# Patient Record
Sex: FEMALE | URBAN - METROPOLITAN AREA
[De-identification: names, ages, dates, MRNs, and addresses within clinical notes are randomized per-mention and may not be internally consistent; named-entity substitution may affect disease eponyms.]

---

## 2023-09-09 ENCOUNTER — INPATIENT (INPATIENT)
Facility: HOSPITAL | Age: 58
LOS: 1 days | Discharge: HOME HEALTH | DRG: 45 | End: 2023-09-11
Attending: HOSPITALIST | Admitting: STUDENT IN AN ORGANIZED HEALTH CARE EDUCATION/TRAINING PROGRAM
Payer: MEDICAID

## 2023-09-09 VITALS
SYSTOLIC BLOOD PRESSURE: 192 MMHG | DIASTOLIC BLOOD PRESSURE: 122 MMHG | HEIGHT: 65 IN | RESPIRATION RATE: 19 BRPM | HEART RATE: 120 BPM | OXYGEN SATURATION: 99 %

## 2023-09-09 DIAGNOSIS — G45.9 TRANSIENT CEREBRAL ISCHEMIC ATTACK, UNSPECIFIED: ICD-10-CM

## 2023-09-09 DIAGNOSIS — I16.1 HYPERTENSIVE EMERGENCY: ICD-10-CM

## 2023-09-09 LAB
ALBUMIN SERPL ELPH-MCNC: 4 G/DL — SIGNIFICANT CHANGE UP (ref 3.3–5)
ALP SERPL-CCNC: 67 U/L — SIGNIFICANT CHANGE UP (ref 40–120)
ALT FLD-CCNC: 24 U/L — SIGNIFICANT CHANGE UP (ref 12–78)
ANION GAP SERPL CALC-SCNC: 8 MMOL/L — SIGNIFICANT CHANGE UP (ref 5–17)
APTT BLD: 30.9 SEC — SIGNIFICANT CHANGE UP (ref 24.5–35.6)
AST SERPL-CCNC: 15 U/L — SIGNIFICANT CHANGE UP (ref 15–37)
BASOPHILS # BLD AUTO: 0.04 K/UL — SIGNIFICANT CHANGE UP (ref 0–0.2)
BASOPHILS NFR BLD AUTO: 0.4 % — SIGNIFICANT CHANGE UP (ref 0–2)
BILIRUB SERPL-MCNC: 0.2 MG/DL — SIGNIFICANT CHANGE UP (ref 0.2–1.2)
BLD GP AB SCN SERPL QL: SIGNIFICANT CHANGE UP
BUN SERPL-MCNC: 19 MG/DL — SIGNIFICANT CHANGE UP (ref 7–23)
CALCIUM SERPL-MCNC: 9.3 MG/DL — SIGNIFICANT CHANGE UP (ref 8.5–10.1)
CHLORIDE SERPL-SCNC: 107 MMOL/L — SIGNIFICANT CHANGE UP (ref 96–108)
CO2 SERPL-SCNC: 23 MMOL/L — SIGNIFICANT CHANGE UP (ref 22–31)
CREAT SERPL-MCNC: 1.02 MG/DL — SIGNIFICANT CHANGE UP (ref 0.5–1.3)
EGFR: 65 ML/MIN/1.73M2 — SIGNIFICANT CHANGE UP
EOSINOPHIL # BLD AUTO: 0.33 K/UL — SIGNIFICANT CHANGE UP (ref 0–0.5)
EOSINOPHIL NFR BLD AUTO: 3.3 % — SIGNIFICANT CHANGE UP (ref 0–6)
GLUCOSE SERPL-MCNC: 122 MG/DL — HIGH (ref 70–99)
HCT VFR BLD CALC: 37.7 % — SIGNIFICANT CHANGE UP (ref 34.5–45)
HGB BLD-MCNC: 12.5 G/DL — SIGNIFICANT CHANGE UP (ref 11.5–15.5)
IMM GRANULOCYTES NFR BLD AUTO: 0.3 % — SIGNIFICANT CHANGE UP (ref 0–0.9)
INR BLD: 0.91 RATIO — SIGNIFICANT CHANGE UP (ref 0.85–1.18)
LYMPHOCYTES # BLD AUTO: 4.29 K/UL — HIGH (ref 1–3.3)
LYMPHOCYTES # BLD AUTO: 43 % — SIGNIFICANT CHANGE UP (ref 13–44)
MCHC RBC-ENTMCNC: 28 PG — SIGNIFICANT CHANGE UP (ref 27–34)
MCHC RBC-ENTMCNC: 33.2 GM/DL — SIGNIFICANT CHANGE UP (ref 32–36)
MCV RBC AUTO: 84.5 FL — SIGNIFICANT CHANGE UP (ref 80–100)
MONOCYTES # BLD AUTO: 0.62 K/UL — SIGNIFICANT CHANGE UP (ref 0–0.9)
MONOCYTES NFR BLD AUTO: 6.2 % — SIGNIFICANT CHANGE UP (ref 2–14)
NEUTROPHILS # BLD AUTO: 4.66 K/UL — SIGNIFICANT CHANGE UP (ref 1.8–7.4)
NEUTROPHILS NFR BLD AUTO: 46.8 % — SIGNIFICANT CHANGE UP (ref 43–77)
PLATELET # BLD AUTO: 377 K/UL — SIGNIFICANT CHANGE UP (ref 150–400)
POTASSIUM SERPL-MCNC: 3.5 MMOL/L — SIGNIFICANT CHANGE UP (ref 3.5–5.3)
POTASSIUM SERPL-SCNC: 3.5 MMOL/L — SIGNIFICANT CHANGE UP (ref 3.5–5.3)
PROT SERPL-MCNC: 8.5 GM/DL — HIGH (ref 6–8.3)
PROTHROM AB SERPL-ACNC: 10.3 SEC — SIGNIFICANT CHANGE UP (ref 9.5–13)
RBC # BLD: 4.46 M/UL — SIGNIFICANT CHANGE UP (ref 3.8–5.2)
RBC # FLD: 13.9 % — SIGNIFICANT CHANGE UP (ref 10.3–14.5)
SODIUM SERPL-SCNC: 138 MMOL/L — SIGNIFICANT CHANGE UP (ref 135–145)
TROPONIN I, HIGH SENSITIVITY RESULT: 13.45 NG/L — SIGNIFICANT CHANGE UP
TROPONIN I, HIGH SENSITIVITY RESULT: 6.55 NG/L — SIGNIFICANT CHANGE UP
WBC # BLD: 9.97 K/UL — SIGNIFICANT CHANGE UP (ref 3.8–10.5)
WBC # FLD AUTO: 9.97 K/UL — SIGNIFICANT CHANGE UP (ref 3.8–10.5)

## 2023-09-09 PROCEDURE — 80061 LIPID PANEL: CPT

## 2023-09-09 PROCEDURE — 70544 MR ANGIOGRAPHY HEAD W/O DYE: CPT

## 2023-09-09 PROCEDURE — 93306 TTE W/DOPPLER COMPLETE: CPT

## 2023-09-09 PROCEDURE — 70450 CT HEAD/BRAIN W/O DYE: CPT | Mod: 26,MA,XU

## 2023-09-09 PROCEDURE — 71045 X-RAY EXAM CHEST 1 VIEW: CPT | Mod: 26

## 2023-09-09 PROCEDURE — 97116 GAIT TRAINING THERAPY: CPT | Mod: GP

## 2023-09-09 PROCEDURE — 80053 COMPREHEN METABOLIC PANEL: CPT

## 2023-09-09 PROCEDURE — 70496 CT ANGIOGRAPHY HEAD: CPT | Mod: 26,MA

## 2023-09-09 PROCEDURE — 85025 COMPLETE CBC W/AUTO DIFF WBC: CPT

## 2023-09-09 PROCEDURE — 84100 ASSAY OF PHOSPHORUS: CPT

## 2023-09-09 PROCEDURE — 97530 THERAPEUTIC ACTIVITIES: CPT | Mod: GO

## 2023-09-09 PROCEDURE — 83735 ASSAY OF MAGNESIUM: CPT

## 2023-09-09 PROCEDURE — 80048 BASIC METABOLIC PNL TOTAL CA: CPT

## 2023-09-09 PROCEDURE — 99223 1ST HOSP IP/OBS HIGH 75: CPT

## 2023-09-09 PROCEDURE — 93010 ELECTROCARDIOGRAM REPORT: CPT

## 2023-09-09 PROCEDURE — 36415 COLL VENOUS BLD VENIPUNCTURE: CPT

## 2023-09-09 PROCEDURE — 0042T: CPT | Mod: MA

## 2023-09-09 PROCEDURE — 92523 SPEECH SOUND LANG COMPREHEN: CPT | Mod: GN

## 2023-09-09 PROCEDURE — 97166 OT EVAL MOD COMPLEX 45 MIN: CPT | Mod: GO

## 2023-09-09 PROCEDURE — 97162 PT EVAL MOD COMPLEX 30 MIN: CPT | Mod: GP

## 2023-09-09 PROCEDURE — 70551 MRI BRAIN STEM W/O DYE: CPT

## 2023-09-09 PROCEDURE — 92610 EVALUATE SWALLOWING FUNCTION: CPT | Mod: GN

## 2023-09-09 PROCEDURE — 99285 EMERGENCY DEPT VISIT HI MDM: CPT | Mod: 25

## 2023-09-09 PROCEDURE — 84484 ASSAY OF TROPONIN QUANT: CPT

## 2023-09-09 PROCEDURE — 70498 CT ANGIOGRAPHY NECK: CPT | Mod: 26,MA

## 2023-09-09 RX ORDER — HEPARIN SODIUM 5000 [USP'U]/ML
5000 INJECTION INTRAVENOUS; SUBCUTANEOUS EVERY 12 HOURS
Refills: 0 | Status: DISCONTINUED | OUTPATIENT
Start: 2023-09-09 | End: 2023-09-11

## 2023-09-09 RX ORDER — ASPIRIN/CALCIUM CARB/MAGNESIUM 324 MG
81 TABLET ORAL DAILY
Refills: 0 | Status: DISCONTINUED | OUTPATIENT
Start: 2023-09-09 | End: 2023-09-09

## 2023-09-09 RX ORDER — ACETAMINOPHEN 500 MG
650 TABLET ORAL EVERY 6 HOURS
Refills: 0 | Status: DISCONTINUED | OUTPATIENT
Start: 2023-09-09 | End: 2023-09-11

## 2023-09-09 RX ORDER — LABETALOL HCL 100 MG
10 TABLET ORAL ONCE
Refills: 0 | Status: COMPLETED | OUTPATIENT
Start: 2023-09-09 | End: 2023-09-09

## 2023-09-09 RX ORDER — ASPIRIN/CALCIUM CARB/MAGNESIUM 324 MG
324 TABLET ORAL ONCE
Refills: 0 | Status: COMPLETED | OUTPATIENT
Start: 2023-09-09 | End: 2023-09-09

## 2023-09-09 RX ORDER — AMLODIPINE BESYLATE 2.5 MG/1
5 TABLET ORAL DAILY
Refills: 0 | Status: DISCONTINUED | OUTPATIENT
Start: 2023-09-09 | End: 2023-09-11

## 2023-09-09 RX ORDER — ASPIRIN/CALCIUM CARB/MAGNESIUM 324 MG
81 TABLET ORAL DAILY
Refills: 0 | Status: DISCONTINUED | OUTPATIENT
Start: 2023-09-09 | End: 2023-09-11

## 2023-09-09 RX ORDER — ATORVASTATIN CALCIUM 80 MG/1
80 TABLET, FILM COATED ORAL AT BEDTIME
Refills: 0 | Status: DISCONTINUED | OUTPATIENT
Start: 2023-09-09 | End: 2023-09-11

## 2023-09-09 RX ORDER — INFLUENZA VIRUS VACCINE 15; 15; 15; 15 UG/.5ML; UG/.5ML; UG/.5ML; UG/.5ML
0.5 SUSPENSION INTRAMUSCULAR ONCE
Refills: 0 | Status: DISCONTINUED | OUTPATIENT
Start: 2023-09-09 | End: 2023-09-11

## 2023-09-09 RX ADMIN — AMLODIPINE BESYLATE 5 MILLIGRAM(S): 2.5 TABLET ORAL at 15:03

## 2023-09-09 RX ADMIN — Medication 324 MILLIGRAM(S): at 09:27

## 2023-09-09 RX ADMIN — HEPARIN SODIUM 5000 UNIT(S): 5000 INJECTION INTRAVENOUS; SUBCUTANEOUS at 22:33

## 2023-09-09 RX ADMIN — Medication 10 MILLIGRAM(S): at 09:26

## 2023-09-09 RX ADMIN — ATORVASTATIN CALCIUM 80 MILLIGRAM(S): 80 TABLET, FILM COATED ORAL at 23:37

## 2023-09-09 NOTE — ED PROVIDER NOTE - OBJECTIVE STATEMENT
Patient is a 56 yo female with hx of HTN; visiting from [facility]- since mid August 2023; unsure names of antihypertensives; patient went to sleep at 10pm last night and awoke between 5:30am-6AM and difficulty with ambulation and use of right arm when patient went to bathroom; brought to ED for evaluation; no chest pain; no sob; no difficulty with speech; no trauma or injury; no anticoagulation; patient reports improvement in symptoms upon arrival in ED.     service: [id number]

## 2023-09-09 NOTE — ED PROVIDER NOTE - NEUROLOGICAL, MLM
Alert and oriented, no focal deficits, (+) 4/5 strength in ue, le on examination; no sensory deficits

## 2023-09-09 NOTE — ED ADULT NURSE NOTE - NSFALLHARMRISKINTERV_ED_ALL_ED
Assistance OOB with selected safe patient handling equipment if applicable/Assistance with ambulation/Communicate risk of Fall with Harm to all staff, patient, and family/Monitor gait and stability/Provide visual cue: red socks, yellow wristband, yellow gown, etc/Reinforce activity limits and safety measures with patient and family/Bed in lowest position, wheels locked, appropriate side rails in place/Call bell, personal items and telephone in reach/Instruct patient to call for assistance before getting out of bed/chair/stretcher/Non-slip footwear applied when patient is off stretcher/[place] to call system/Physically safe environment - no spills, clutter or unnecessary equipment/Purposeful Proactive Rounding/Room/bathroom lighting operational, light cord in reach

## 2023-09-09 NOTE — ED ADULT TRIAGE NOTE - CHIEF COMPLAINT QUOTE
Pt presents to er with complaints of goping to bed at 10pm last night feeling normal, states she noticed at 05:30 this morning that she had right sided weakness and numbness when she went to use the bathroom, pt has a history of HTN, came from [facility], neg use of blood thinners,  assessed pt and code stroke activated at 07:55, pos BEFAST. Pt presents to er with complaints of going to bed at 10pm last night feeling normal, states she noticed at 05:30 this morning that she had right sided weakness and numbness when she went to use the bathroom, pt has a history of HTN, came from [facility], neg use of blood thinners,  assessed pt and code stroke activated at 07:55, pos BEFAST.

## 2023-09-09 NOTE — ED ADULT NURSE NOTE - CHIEF COMPLAINT QUOTE
Pt presents to er with complaints of going to bed at 10pm last night feeling normal, states she noticed at 05:30 this morning that she had right sided weakness and numbness when she went to use the bathroom, pt has a history of HTN, came from [facility], neg use of blood thinners,  assessed pt and code stroke activated at 07:55, pos BEFAST.

## 2023-09-09 NOTE — H&P ADULT - HISTORY OF PRESENT ILLNESS
Patient is a 56 yo woman with hypertension who presented with right sided weakness. She went to bed at approximately 10 PM on 9/8/23 in her usual state of health. On 9/9/23 she woke up sometime between 5:30-6:30 AM with right sided flaccid paralysis. Patient is a 56 y/o/f with PMHx of hypertension (was on meds -  now not because she ran out of it) admitted in the hospital 9/9 with cc of (R) sided weakness. According to the patient, she went to bed - approximately 10 PM on 9/8/23. On 9/9/23 she woke up sometime between 5:30-6:30 AM with right sided flaccid paralysis. Motor weakness resolved . ED workup with high BP / CT normal /  High BPs.     Pending MRI of the brain /  Med optimization for high BPs.

## 2023-09-09 NOTE — PHARMACOTHERAPY INTERVENTION NOTE - COMMENTS
Medication history complete. Medications and allergies reviewed with patient's brother, Reji at bedside and confirmed with .

## 2023-09-09 NOTE — CONSULT NOTE ADULT - ASSESSMENT
57 year old woman with hypertension who presented with right sided weakness upon awakening.  Last well known was on 9/8/23 at 22:00.  She was outside of the time window for IV thrombolytics.  CTA did not show evidence of a large vessel thrombosis.    Expect left sided acute infarct.  -MRI brain  -MRA brain to follow up possible right PCA stenosis (although not the cause of this event).  -Aspirin 81 mg/day for now. Depending on MRI may add Plavix  -Monitor on telemetry for atrial fibrillation.  -Permissive hypertension for first 24 hours (can keep SBP in the 140s-160s)  -Add atorvastatin  -Check HbA1C, lipid panel  -DVT prophylaxis  -TTE  -PMR eval if no improvement by Monday  -PT  -Speech if any signs of dysphagia. Difficult to tell whether she had dysarthria    Will follow

## 2023-09-09 NOTE — ED ADULT NURSE NOTE - OBJECTIVE STATEMENT
Pt presents to er with complaints of going to bed at 10pm last night feeling normal, states she noticed at 05:30 this morning that she had right sided weakness and numbness when she went to use the bathroom, pt has a history of HTN, came from [facility], neg use of blood thinners,  assessed pt and code stroke activated at 07:55, pos BEFAST.

## 2023-09-09 NOTE — CONSULT NOTE ADULT - SUBJECTIVE AND OBJECTIVE BOX
Patient is a 57y old  Female who presents with a chief complaint of     HPI: 56 yo woman with hypertension who presented with right sided weakness.  She went to bed at approximately 10 PM on 9/8/23 in her usual state of health.  On 9/9/23 she woke up sometime between 5:30-6:30 AM with right sided flaccid paralysis.          PAST MEDICAL & SURGICAL HISTORY:  Hypertension          FAMILY HISTORY:      Social Hx:  Nonsmoker, no drug or alcohol use    MEDICATIONS  (STANDING):       Allergies    No Known Allergies    Intolerances        ROS: Pertinent positives in HPI, all other ROS were reviewed and are negative.      Vital Signs Last 24 Hrs  T(C): 36.9 (09 Sep 2023 10:33), Max: 36.9 (09 Sep 2023 10:33)  T(F): 98.4 (09 Sep 2023 10:33), Max: 98.4 (09 Sep 2023 10:33)  HR: 80 (09 Sep 2023 10:33) (80 - 120)  BP: 156/109 (09 Sep 2023 10:33) (149/103 - 208/130)  BP(mean): 158 (09 Sep 2023 10:30) (117 - 158)  RR: 22 (09 Sep 2023 10:33) (18 - 22)  SpO2: 100% (09 Sep 2023 10:33) (98% - 100%)    Parameters below as of 09 Sep 2023 10:33  Patient On (Oxygen Delivery Method): room air            Constitutional: awake and alert.  HEENT: PERRLA, EOMI,   Neck: Supple.  Respiratory: Breath sounds are clear bilaterally  Cardiovascular: S1 and S2, regular / irregular rhythm  Gastrointestinal: soft, nontender  Extremities:  no edema  Vascular: Caritid Bruit - no  Musculoskeletal: no joint swelling/tenderness, no abnormal movements  Skin: No rashes    Neurological exam:  HF: A x O x 3. Appropriately interactive, normal affect. Speech fluent, No Aphasia or paraphasic errors. Naming /repetition intact   CN: HILARIO, EOMI, VFF, facial sensation normal, no NLFD, tongue midline, Palate moves equally, SCM equal bilaterally  Motor: No pronator drift, Strength 5/5 in all 4 ext, normal bulk and tone, no tremor, rigidity or bradykinesia.    Sens: Intact to light touch / PP/ VS/ JS    Reflexes: Symmetric and normal . BJ 2+, BR 2+, KJ 2+, AJ 2+, downgoing toes b/l  Coord:  No FNFA, dysmetria, IBIS intact   Gait/Balance: Normal/Cannot test    NIHSS:          Labs:   09-09    138  |  107  |  19  ----------------------------<  122<H>  3.5   |  23  |  1.02    Ca    9.3      09 Sep 2023 08:10    TPro  8.5<H>  /  Alb  4.0  /  TBili  0.2  /  DBili  x   /  AST  15  /  ALT  24  /  AlkPhos  67  09-09                              12.5   9.97  )-----------( 377      ( 09 Sep 2023 08:10 )             37.7       Radiology:  CT head 9/9/23:  No CT evidence of acute intracranial pathology.    CTA head, neck, CT perfusion 9/9/23:  CT PERFUSION: No core infarct or penumbra of ischemic tissue is   identified by CT perfusion.  Follow-up MRI of brain is recommendedto   exclude a small infarct.    CT ANGIOGRAPHY NECK: Patent cervical vasculature.  No hemodynamically   significant carotid stenosis or flow-limiting vertebral artery stenosis.    No evidence of dissection.    CT ANGIOGRAPHY BRAIN:  1. A right posterior communicating artery is grossly patent; its   anastomosis with the right posterior cerebral artery is not well   visualized which is thought to be due to mild motion artifact rather than   stenosis or occlusion.  If clinically warranted,  this may be further   evaluated with a MRA brain. Otherwise, there is no evidence of a vessel   occlusion, stenosis or aneurysm about the [place].  2.  No evidence of dural venous sinus thrombosis or an arteriovenous   malformation.   Patient is a 57y old  Female who presents with a chief complaint of right sided weakness.    HPI: 58 yo woman with hypertension who presented with right sided weakness.  History was obtained from Dr. Ordoñez and her brother who is at the bedside and helping with translation.  She recently moved from [facility]. She speaks Creole.  She went to bed at approximately 10 PM on 9/8/23 in her usual state of health.  On 9/9/23 she woke up sometime between 5:30-6:30 AM with right sided flaccid paralysis.  In the ED her BP was found to be 192/122. She has been seen at the [facility] and is prescribed amlodipine 10 mg/day. It is unclear if she has been taking this. She does not take aspirin or other antiplatelets or anticoagulation at home.   Right sided weakness has improved since arrival.          PAST MEDICAL & SURGICAL HISTORY:  Hypertension          FAMILY HISTORY:  unclear      Social Hx:  Nonsmoker, no drug or alcohol use    MEDICATIONS  (STANDING):  amlodipine 10 mg/day    Allergies    No Known Allergies    Intolerances        ROS: Pertinent positives in HPI, all other ROS were reviewed and are negative.      Vital Signs Last 24 Hrs  T(C): 36.9 (09 Sep 2023 10:33), Max: 36.9 (09 Sep 2023 10:33)  T(F): 98.4 (09 Sep 2023 10:33), Max: 98.4 (09 Sep 2023 10:33)  HR: 80 (09 Sep 2023 10:33) (80 - 120)  BP: 156/109 (09 Sep 2023 10:33) (149/103 - 208/130)  BP(mean): 158 (09 Sep 2023 10:30) (117 - 158)  RR: 22 (09 Sep 2023 10:33) (18 - 22)  SpO2: 100% (09 Sep 2023 10:33) (98% - 100%)    Parameters below as of 09 Sep 2023 10:33  Patient On (Oxygen Delivery Method): room air            Constitutional: awake and alert.  HEENT: PERRLA, EOMI,   Neck: Supple.  Respiratory: Breath sounds are clear bilaterally  Cardiovascular: S1 and S2, regular / irregular rhythm  Gastrointestinal: soft, nontender  Extremities:  no edema  Musculoskeletal: no joint swelling/tenderness, no abnormal movements  Skin: No rashes    Neurological exam:  HF: somnolent, easily arousable,  Appropriately interactive, normal affect. Speech fluent, No Aphasia or paraphasic errors. Naming /repetition intact   CN: HILARIO, EOMI, VFF, decreased facial sensation on right, decreased nasolabial fold on right, tongue midline  Motor: RUE drift. 4/5 in RUE, 5/5 in LUE, 4+/5 in RLE, 5/5 in LLE  Sens: subjective decreased sensation in right arm, intact in right leg  Reflexes: Symmetric and normal . BJ 2+, BR 2+, KJ 2+, AJ 2+, downgoing toes b/l  Coord:  + dysmetria on finger to nose on right  Gait/Balance: Not tested    NIHSS: 5          Labs:   09-09    138  |  107  |  19  ----------------------------<  122<H>  3.5   |  23  |  1.02    Ca    9.3      09 Sep 2023 08:10    TPro  8.5<H>  /  Alb  4.0  /  TBili  0.2  /  DBili  x   /  AST  15  /  ALT  24  /  AlkPhos  67  09-09                              12.5   9.97  )-----------( 377      ( 09 Sep 2023 08:10 )             37.7       Radiology:  CT head 9/9/23:  No CT evidence of acute intracranial pathology.    CTA head, neck, CT perfusion 9/9/23:  CT PERFUSION: No core infarct or penumbra of ischemic tissue is   identified by CT perfusion.  Follow-up MRI of brain is recommendedto   exclude a small infarct.    CT ANGIOGRAPHY NECK: Patent cervical vasculature.  No hemodynamically   significant carotid stenosis or flow-limiting vertebral artery stenosis.    No evidence of dissection.    CT ANGIOGRAPHY BRAIN:  1. A right posterior communicating artery is grossly patent; its   anastomosis with the right posterior cerebral artery is not well   visualized which is thought to be due to mild motion artifact rather than   stenosis or occlusion.  If clinically warranted,  this may be further   evaluated with a MRA brain. Otherwise, there is no evidence of a vessel   occlusion, stenosis or aneurysm about the [place].  2.  No evidence of dural venous sinus thrombosis or an arteriovenous   malformation.

## 2023-09-09 NOTE — H&P ADULT - NSHPPHYSICALEXAM_GEN_ALL_CORE
Physical Exam:   GENERAL APPEARANCE:  NAD, hemodynamically stable  T(C): 36.9 (09-09-23 @ 10:33), Max: 36.9 (09-09-23 @ 10:33)  HR: 80 (09-09-23 @ 10:33) (80 - 120)  BP: 156/109 (09-09-23 @ 10:33) (149/103 - 208/130)  RR: 22 (09-09-23 @ 10:33) (18 - 22)  SpO2: 100% (09-09-23 @ 10:33) (98% - 100%)  HEENT:  Head is normocephalic    Skin:  Warm and dry without any rash   NECK:  Supple without lymphadenopathy.   HEART:  Regular rate and rhythm. normal S1 and S2, No M/R/G  LUNGS:  Good ins/exp effort, no W/R/R/C  ABDOMEN:  Soft, nontender, nondistended with good bowel sounds heard  EXTREMITIES:  Without cyanosis, clubbing or edema.   NEUROLOGICAL:  Gross nonfocal

## 2023-09-09 NOTE — PATIENT PROFILE ADULT - FALL HARM RISK - HARM RISK INTERVENTIONS

## 2023-09-09 NOTE — H&P ADULT - NSHPLABSRESULTS_GEN_ALL_CORE
CBC Full  -  ( 09 Sep 2023 08:10 )  WBC Count : 9.97 K/uL  RBC Count : 4.46 M/uL  Hemoglobin : 12.5 g/dL  Hematocrit : 37.7 %  Platelet Count - Automated : 377 K/uL  Mean Cell Volume : 84.5 fl  Mean Cell Hemoglobin : 28.0 pg  Mean Cell Hemoglobin Concentration : 33.2 gm/dL  Auto Neutrophil # : 4.66 K/uL  Auto Lymphocyte # : 4.29 K/uL  Auto Monocyte # : 0.62 K/uL  Auto Eosinophil # : 0.33 K/uL  Auto Basophil # : 0.04 K/uL  Auto Neutrophil % : 46.8 %  Auto Lymphocyte % : 43.0 %  Auto Monocyte % : 6.2 %  Auto Eosinophil % : 3.3 %  Auto Basophil % : 0.4 %    PT/INR - ( 09 Sep 2023 08:10 )   PT: 10.3 sec;   INR: 0.91 ratio         PTT - ( 09 Sep 2023 08:10 )  PTT:30.9 sec  Urinalysis Basic - ( 09 Sep 2023 08:10 )    Color: x / Appearance: x / SG: x / pH: x  Gluc: 122 mg/dL / Ketone: x  / Bili: x / Urobili: x   Blood: x / Protein: x / Nitrite: x   Leuk Esterase: x / RBC: x / WBC x   Sq Epi: x / Non Sq Epi: x / Bacteria: x      09-09    138  |  107  |  19  ----------------------------<  122<H>  3.5   |  23  |  1.02    Ca    9.3      09 Sep 2023 08:10    TPro  8.5<H>  /  Alb  4.0  /  TBili  0.2  /  DBili  x   /  AST  15  /  ALT  24  /  AlkPhos  67  09-09

## 2023-09-09 NOTE — ED PROVIDER NOTE - PROGRESS NOTE DETAILS
Tiago BRYANT: Further hx clarified with patient's brother now at bedside- patient has been living here for last 6 years and has care at the [facility]- unsure of anithypertensives but reports compliance; right sided weakness improving on re-evaluation; full dose asa given; will monitor BP: neuro consult placed; admit for MRI, HTN management and further work up; Endorsed to Dr. Leggett

## 2023-09-09 NOTE — H&P ADULT - PROBLEM SELECTOR PLAN 1
- tele monitoring  - stroke protocol used  - CT scan reviewed  - pending MRI  - ASA /  STATIN  - Neurology following

## 2023-09-09 NOTE — ED PROVIDER NOTE - NIH STROKE SCALE: 2. BEST GAZE, QM
(0) Normal
No. MATT screening performed.  STOP BANG Legend: 0-2 = LOW Risk; 3-4 = INTERMEDIATE Risk; 5-8 = HIGH Risk

## 2023-09-09 NOTE — ED PROVIDER NOTE - CLINICAL SUMMARY MEDICAL DECISION MAKING FREE TEXT BOX
58 yo female with hx of HTN; hypertensive upon arrival; last normal at 10pm on 9/8 with right sided upper and lower extremity weakness improved; outside window for TNK; code stroke activated 2/2 to symptoms; taken for CT imaging; basic labs; screening ekg, neuro consult and admit

## 2023-09-10 LAB
A1C WITH ESTIMATED AVERAGE GLUCOSE RESULT: 6.3 % — HIGH (ref 4–5.6)
ANION GAP SERPL CALC-SCNC: 7 MMOL/L — SIGNIFICANT CHANGE UP (ref 5–17)
BUN SERPL-MCNC: 21 MG/DL — SIGNIFICANT CHANGE UP (ref 7–23)
CALCIUM SERPL-MCNC: 9.4 MG/DL — SIGNIFICANT CHANGE UP (ref 8.5–10.1)
CHLORIDE SERPL-SCNC: 108 MMOL/L — SIGNIFICANT CHANGE UP (ref 96–108)
CHOLEST SERPL-MCNC: 230 MG/DL — HIGH
CO2 SERPL-SCNC: 24 MMOL/L — SIGNIFICANT CHANGE UP (ref 22–31)
CREAT SERPL-MCNC: 1.03 MG/DL — SIGNIFICANT CHANGE UP (ref 0.5–1.3)
EGFR: 64 ML/MIN/1.73M2 — SIGNIFICANT CHANGE UP
ESTIMATED AVERAGE GLUCOSE: 134 MG/DL — HIGH (ref 68–114)
GLUCOSE SERPL-MCNC: 112 MG/DL — HIGH (ref 70–99)
HDLC SERPL-MCNC: 46 MG/DL — LOW
LIPID PNL WITH DIRECT LDL SERPL: 151 MG/DL — HIGH
NON HDL CHOLESTEROL: 184 MG/DL — HIGH
POTASSIUM SERPL-MCNC: 4.1 MMOL/L — SIGNIFICANT CHANGE UP (ref 3.5–5.3)
POTASSIUM SERPL-SCNC: 4.1 MMOL/L — SIGNIFICANT CHANGE UP (ref 3.5–5.3)
SODIUM SERPL-SCNC: 139 MMOL/L — SIGNIFICANT CHANGE UP (ref 135–145)
TRIGL SERPL-MCNC: 184 MG/DL — HIGH

## 2023-09-10 PROCEDURE — 99232 SBSQ HOSP IP/OBS MODERATE 35: CPT

## 2023-09-10 PROCEDURE — 99233 SBSQ HOSP IP/OBS HIGH 50: CPT

## 2023-09-10 PROCEDURE — 70544 MR ANGIOGRAPHY HEAD W/O DYE: CPT | Mod: 26,59

## 2023-09-10 PROCEDURE — 70551 MRI BRAIN STEM W/O DYE: CPT | Mod: 26

## 2023-09-10 RX ORDER — CLOPIDOGREL BISULFATE 75 MG/1
75 TABLET, FILM COATED ORAL DAILY
Refills: 0 | Status: DISCONTINUED | OUTPATIENT
Start: 2023-09-10 | End: 2023-09-11

## 2023-09-10 RX ADMIN — AMLODIPINE BESYLATE 5 MILLIGRAM(S): 2.5 TABLET ORAL at 09:22

## 2023-09-10 RX ADMIN — Medication 650 MILLIGRAM(S): at 21:42

## 2023-09-10 RX ADMIN — ATORVASTATIN CALCIUM 80 MILLIGRAM(S): 80 TABLET, FILM COATED ORAL at 21:41

## 2023-09-10 RX ADMIN — CLOPIDOGREL BISULFATE 75 MILLIGRAM(S): 75 TABLET, FILM COATED ORAL at 09:22

## 2023-09-10 RX ADMIN — Medication 650 MILLIGRAM(S): at 22:12

## 2023-09-10 RX ADMIN — Medication 650 MILLIGRAM(S): at 08:46

## 2023-09-10 RX ADMIN — Medication 81 MILLIGRAM(S): at 09:23

## 2023-09-10 RX ADMIN — HEPARIN SODIUM 5000 UNIT(S): 5000 INJECTION INTRAVENOUS; SUBCUTANEOUS at 21:41

## 2023-09-10 RX ADMIN — Medication 650 MILLIGRAM(S): at 08:16

## 2023-09-10 RX ADMIN — HEPARIN SODIUM 5000 UNIT(S): 5000 INJECTION INTRAVENOUS; SUBCUTANEOUS at 09:23

## 2023-09-10 NOTE — SWALLOW BEDSIDE ASSESSMENT ADULT - SWALLOW EVAL: CRITERIA FOR SKILLED INTERVENTION MET
DO NOT FEEL THAT ACUTE SPEECH PATHOLOGY FOLLOW UP WOULD CHANGE CLINICAL MANAGEMENT/OUTCOME IN HOSPITAL SETTING. PT'S SPEECH-LANGUAGE AND OROPHARYNGEAL SWALLOWING INTEGRITY ARE FELT TO BE FUNCTIONAL/At REPORTED USUAL STATE. GIVEN ABOVE, THIS SERVICE WILL NOT ACTIVELY FOLLOW. RECONSULT PRN SHOULD STATUS CHANGE AND CONDITION WARRANT.

## 2023-09-10 NOTE — SWALLOW BEDSIDE ASSESSMENT ADULT - SWALLOW EVAL: RECOMMENDED DIET
SUGGEST A REGULAR CONSISTENCY DIET WITH THIN LIQUIDS TEXTURES AS THE PATIENT APPEARED CLINICALLY TOLERANT OF THESE FOOD CONSISTENCIES FROM AN OROPHARYNGEAL SWALLOWING PERSPECTIVE ON EXAM.

## 2023-09-10 NOTE — PROGRESS NOTE ADULT - ASSESSMENT
57 year old woman with hypertension who presented with right sided weakness upon awakening.  Last well known was on 9/8/23 at 22:00.  She was outside of the time window for IV thrombolytics.  CTA did not show evidence of a large vessel thrombosis.    Symptoms are improving. NIHSS from 5 to 1 for slight decrease in sensation    Expect left sided acute infarct.  -MRI brain pending  -MRA brain to follow up possible right PCA stenosis (although not the cause of this event).  -Aspirin 81 mg/day, will add Plavix x 21 days  -Monitor on telemetry for atrial fibrillation.  -Can normalize blood pressure  -Continue atorvastatin 80 mg/day  -f/u HbA1C, lipid panel  -DVT prophylaxis  -TTE    Dr. Orozco will cover neurology beginning 9/11 and follow up as needed.  -PMR eval if no improvement by Monday  -PT  -Speech if any signs of dysphagia. Difficult to tell whether she had dysarthria    Will follow

## 2023-09-10 NOTE — PROGRESS NOTE ADULT - SUBJECTIVE AND OBJECTIVE BOX
C/c: right sided weakness.    HPI:  56 y/o/f with PMHx of hypertension (was on meds -  now not because she ran out of it) admitted in the hospital 9/9 with cc of (R) sided weakness. According to the patient, she went to bed - approximately 10 PM on 9/8/23. On 9/9/23 she woke up sometime between 5:30-6:30 AM with right sided flaccid paralysis. Motor weakness has since resolved . ED workup with high BP / CT normal /  High BPs.     pt seen and examined this am. c/o pain right thigh. Weakness improved. no sob/chest pain. tolerating po. no headache/blurry vision.     Review of system- All 10 systems reviewed and is as per HPI otherwise negative.       VITALS  T(C): 37 (09-10-23 @ 09:04), Max: 37.1 (09-09-23 @ 13:08)  HR: 78 (09-10-23 @ 09:04) (73 - 79)  BP: 126/87 (09-10-23 @ 09:04) (126/87 - 147/98)  RR: 18 (09-10-23 @ 09:04) (18 - 20)  SpO2: 97% (09-10-23 @ 09:04) (97% - 100%)    PHYSICAL EXAM:    GENERAL: Comfortable, no acute distress  HEAD:  Atraumatic, Normocephalic  EYES: EOMI, PERRLA  HEENT: Moist mucous membranes  NECK: Supple, No JVD  NERVOUS SYSTEM:  Alert & Oriented X3, Motor Strength 5/5 B/L upper and lower extremities  CHEST/LUNG: Clear to auscultation bilaterally  HEART: Regular rate and rhythm; No murmurs, rubs, or gallops  ABDOMEN: Soft, Nontender, Nondistended; Bowel sounds present  GENITOURINARY- Voiding, no palpable bladder  EXTREMITIES:  No clubbing, cyanosis, or edema  MUSCULOSKELETAL- No muscle tenderness, No joint tenderness  SKIN-no rash        LABS:                        12.5   9.97  )-----------( 377      ( 09 Sep 2023 08:10 )             37.7     09-10    139  |  108  |  21  ----------------------------<  112<H>  4.1   |  24  |  1.03    Ca    9.4      10 Sep 2023 08:19    TPro  8.5<H>  /  Alb  4.0  /  TBili  0.2  /  DBili  x   /  AST  15  /  ALT  24  /  AlkPhos  67  09-09    PT/INR - ( 09 Sep 2023 08:10 )   PT: 10.3 sec;   INR: 0.91 ratio         PTT - ( 09 Sep 2023 08:10 )  PTT:30.9 sec  Urinalysis Basic - ( 10 Sep 2023 08:19 )    Color: x / Appearance: x / SG: x / pH: x  Gluc: 112 mg/dL / Ketone: x  / Bili: x / Urobili: x   Blood: x / Protein: x / Nitrite: x   Leuk Esterase: x / RBC: x / WBC x   Sq Epi: x / Non Sq Epi: x / Bacteria: x     MR Head No Cont (09.10.23 @ 10:22) >  IMPRESSION:  MRI BRAIN: Acute left basal ganglia/corona radiata infarction.  MRA BRAIN: Unremarkable.        MEDS  acetaminophen     Tablet .. 650 milliGRAM(s) Oral every 6 hours PRN  amLODIPine   Tablet 5 milliGRAM(s) Oral daily  aspirin  chewable 81 milliGRAM(s) Oral daily  atorvastatin 80 milliGRAM(s) Oral at bedtime  clopidogrel Tablet 75 milliGRAM(s) Oral daily  heparin   Injectable 5000 Unit(s) SubCutaneous every 12 hours  influenza   Vaccine 0.5 milliLiter(s) IntraMuscular once    ASSESSMENT AND PLAN:  1. Acute left basal ganglia/corona radiata infarction:  -NIHSS in ED 5.   -continue asa/plavix per neurology.   -will order echo.   -cardiology consult.   -tele to eval for a fib.   -PT eval pending.     2. HTN:  -continue norvasc    3. DVT px:  -on hep sq.                  C/c: right sided weakness.    HPI:  56 y/o/f with PMHx of hypertension (was on meds -  now not because she ran out of it) admitted in the hospital 9/9 with cc of (R) sided weakness. According to the patient, she went to bed - approximately 10 PM on 9/8/23. On 9/9/23 she woke up sometime between 5:30-6:30 AM with right sided flaccid paralysis. Motor weakness has since resolved . ED workup with high BP / CT normal /  High BPs.     pt seen and examined this am. c/o pain right thigh. Weakness improved. no sob/chest pain. tolerating po. no headache/blurry vision.     Review of system- All 10 systems reviewed and is as per HPI otherwise negative.       VITALS  T(C): 37 (09-10-23 @ 09:04), Max: 37.1 (09-09-23 @ 13:08)  HR: 78 (09-10-23 @ 09:04) (73 - 79)  BP: 126/87 (09-10-23 @ 09:04) (126/87 - 147/98)  RR: 18 (09-10-23 @ 09:04) (18 - 20)  SpO2: 97% (09-10-23 @ 09:04) (97% - 100%)    PHYSICAL EXAM:    GENERAL: Comfortable, no acute distress  HEAD:  Atraumatic, Normocephalic  EYES: EOMI, PERRLA  HEENT: Moist mucous membranes  NECK: Supple, No JVD  NERVOUS SYSTEM:  Alert & Oriented X3, Motor Strength 5/5 B/L upper and lower extremities  CHEST/LUNG: Clear to auscultation bilaterally  HEART: Regular rate and rhythm; No murmurs, rubs, or gallops  ABDOMEN: Soft, Nontender, Nondistended; Bowel sounds present  GENITOURINARY- Voiding, no palpable bladder  EXTREMITIES:  No clubbing, cyanosis, or edema  MUSCULOSKELETAL- No muscle tenderness, No joint tenderness  SKIN-no rash        LABS:                        12.5   9.97  )-----------( 377      ( 09 Sep 2023 08:10 )             37.7     09-10    139  |  108  |  21  ----------------------------<  112<H>  4.1   |  24  |  1.03    Ca    9.4      10 Sep 2023 08:19    TPro  8.5<H>  /  Alb  4.0  /  TBili  0.2  /  DBili  x   /  AST  15  /  ALT  24  /  AlkPhos  67  09-09    PT/INR - ( 09 Sep 2023 08:10 )   PT: 10.3 sec;   INR: 0.91 ratio         PTT - ( 09 Sep 2023 08:10 )  PTT:30.9 sec  Urinalysis Basic - ( 10 Sep 2023 08:19 )    Color: x / Appearance: x / SG: x / pH: x  Gluc: 112 mg/dL / Ketone: x  / Bili: x / Urobili: x   Blood: x / Protein: x / Nitrite: x   Leuk Esterase: x / RBC: x / WBC x   Sq Epi: x / Non Sq Epi: x / Bacteria: x     MR Head No Cont (09.10.23 @ 10:22) >  IMPRESSION:  MRI BRAIN: Acute left basal ganglia/corona radiata infarction.  MRA BRAIN: Unremarkable.        MEDS  acetaminophen     Tablet .. 650 milliGRAM(s) Oral every 6 hours PRN  amLODIPine   Tablet 5 milliGRAM(s) Oral daily  aspirin  chewable 81 milliGRAM(s) Oral daily  atorvastatin 80 milliGRAM(s) Oral at bedtime  clopidogrel Tablet 75 milliGRAM(s) Oral daily  heparin   Injectable 5000 Unit(s) SubCutaneous every 12 hours  influenza   Vaccine 0.5 milliLiter(s) IntraMuscular once    ASSESSMENT AND PLAN:  1. Acute left basal ganglia/corona radiata infarction:  -NIHSS in ED 5.   -  -suspected to be related to uncontrolled HTN per neurology.   -continue asa/plavix/statin  -will order echo.   -tele to eval for a fib.   -PT eval pending.     2. HTN:  -continue norvasc    3. DVT px:  -on hep sq.

## 2023-09-10 NOTE — PHYSICAL THERAPY INITIAL EVALUATION ADULT - MODALITIES TREATMENT COMMENTS
Pt ambulates independently with steady gait. No deficits noted at this time. Pt does not need skilled PT at this time.

## 2023-09-10 NOTE — SWALLOW BEDSIDE ASSESSMENT ADULT - NS SPL SWALLOW CLINIC TRIAL FT
Pt exhibited Oropharyngeal Swallowing integrity which subjectively appeared to be within functional parameters for age. Bolus formation/transfer were mechanically functional for age and laryngeal lift on palpation during swallowing trials was felt to be functional for age as well. NO behavioral aspiration signs exhibited. No change in O2 sats noted. Odynophagia was denied.

## 2023-09-10 NOTE — SWALLOW BEDSIDE ASSESSMENT ADULT - SWALLOW EVAL: PROGNOSIS
2) Pt exhibits Oropharyngeal Swallowing integrity which subjectively appeared to be within functional parameters for age. Bolus formation/transfer were mechanically functional for age and laryngeal lift on palpation during swallowing trials was felt to be functional for age as well. NO behavioral aspiration signs exhibited. No change in O2 sats noted. Odynophagia was denied.

## 2023-09-10 NOTE — PROGRESS NOTE ADULT - SUBJECTIVE AND OBJECTIVE BOX
Interval History:  9/10/23: Reports some back pain      MEDICATIONS  (STANDING):  amLODIPine   Tablet 5 milliGRAM(s) Oral daily  aspirin  chewable 81 milliGRAM(s) Oral daily  atorvastatin 80 milliGRAM(s) Oral at bedtime  heparin   Injectable 5000 Unit(s) SubCutaneous every 12 hours  influenza   Vaccine 0.5 milliLiter(s) IntraMuscular once    MEDICATIONS  (PRN):  acetaminophen     Tablet .. 650 milliGRAM(s) Oral every 6 hours PRN Mild Pain (1 - 3)      Allergies    No Known Allergies    Intolerances        PHYSICAL EXAM:  Vital Signs Last 24 Hrs  T(F): 98.5 (09-09-23 @ 23:08)  HR: 73 (09-09-23 @ 23:08)  BP: 135/84 (09-09-23 @ 23:08)  RR: 18 (09-09-23 @ 23:08)    GENERAL: NAD, well-groomed, well-developed  HEAD:  Atraumatic, Normocephalic  Neuro:  Awake, alert, no aphasia  CN: PERRL, EOMI, no nystagmus, no facial weakness, tongue protrudes in the midline  motor: normal tone, no pronator drift, full strength in all four extremities  sensory: mild decreased sensation on right  coordination: finger to nose intact bilaterally  DTRs: symmetric, plantar responses flexor bilaterally    NIH Stroke Scale 1    LABS:                        12.5   9.97  )-----------( 377      ( 09 Sep 2023 08:10 )             37.7     09-09    138  |  107  |  19  ----------------------------<  122<H>  3.5   |  23  |  1.02    Ca    9.3      09 Sep 2023 08:10    TPro  8.5<H>  /  Alb  4.0  /  TBili  0.2  /  DBili  x   /  AST  15  /  ALT  24  /  AlkPhos  67  09-09    PT/INR - ( 09 Sep 2023 08:10 )   PT: 10.3 sec;   INR: 0.91 ratio         PTT - ( 09 Sep 2023 08:10 )  PTT:30.9 sec  Urinalysis Basic - ( 09 Sep 2023 08:10 )    Color: x / Appearance: x / SG: x / pH: x  Gluc: 122 mg/dL / Ketone: x  / Bili: x / Urobili: x   Blood: x / Protein: x / Nitrite: x   Leuk Esterase: x / RBC: x / WBC x   Sq Epi: x / Non Sq Epi: x / Bacteria: x        RADIOLOGY & ADDITIONAL STUDIES:  CT head 9/9/23:  No CT evidence of acute intracranial pathology.    CTA head, neck, CT perfusion 9/9/23:  CT PERFUSION: No core infarct or penumbra of ischemic tissue is   identified by CT perfusion.  Follow-up MRI of brain is recommendedto   exclude a small infarct.    CT ANGIOGRAPHY NECK: Patent cervical vasculature.  No hemodynamically   significant carotid stenosis or flow-limiting vertebral artery stenosis.    No evidence of dissection.    CT ANGIOGRAPHY BRAIN:  1. A right posterior communicating artery is grossly patent; its   anastomosis with the right posterior cerebral artery is not well   visualized which is thought to be due to mild motion artifact rather than   stenosis or occlusion.  If clinically warranted,  this may be further   evaluated with a MRA brain. Otherwise, there is no evidence of a vessel   occlusion, stenosis or aneurysm about the [place].  2.  No evidence of dural venous sinus thrombosis or an arteriovenous   malformation.

## 2023-09-10 NOTE — SWALLOW BEDSIDE ASSESSMENT ADULT - SWALLOW EVAL: DIAGNOSIS
1) On encounter, the pt was alert and interactive. She was able to verbalize during communicative probes primarily in Creole, with occasional scattered English. When pt verbalized, her motor speech integrity was felt to be functional, her speech output was intelligible and her verbalizations were linguistically intact/contextually appropriate. Pt was able to verbalize her intents in Creole, and feels that her communicative integrity is at usual state.

## 2023-09-10 NOTE — SWALLOW BEDSIDE ASSESSMENT ADULT - COMMENTS
Pt admitted to  with right sided weakness suspect for a CVA, with symptoms that are improving. Imaging remarkable for possible right PCA stenosis which is unrelated to her admitting symptoms. This profile is superimposed upon a h/o HTN.

## 2023-09-10 NOTE — SWALLOW BEDSIDE ASSESSMENT ADULT - ASR SWALLOW LABIAL MOBILITY
A slight right lip lag was evident but labial strength/agility were felt to be functional for speech-deglutition purposes.

## 2023-09-10 NOTE — PHYSICAL THERAPY INITIAL EVALUATION ADULT - PERTINENT HX OF CURRENT PROBLEM, REHAB EVAL
Pt more active at this time wanting to play with TV remote. . IV fluids running Roosvelt Aguilar still. Pt admitted to  secondary to R sided weakness, hypertension, r/o CVA. CT brain (-). MRI brain (+) acute L basal ganglia, corona radiata infarct. Pt agreeable to PT. Pt is Ethiopian creole speaking.

## 2023-09-11 ENCOUNTER — TRANSCRIPTION ENCOUNTER (OUTPATIENT)
Age: 58
End: 2023-09-11

## 2023-09-11 VITALS
TEMPERATURE: 98 F | OXYGEN SATURATION: 97 % | RESPIRATION RATE: 18 BRPM | DIASTOLIC BLOOD PRESSURE: 71 MMHG | SYSTOLIC BLOOD PRESSURE: 123 MMHG | HEART RATE: 89 BPM

## 2023-09-11 LAB
ALBUMIN SERPL ELPH-MCNC: 3.5 G/DL — SIGNIFICANT CHANGE UP (ref 3.3–5)
ALP SERPL-CCNC: 64 U/L — SIGNIFICANT CHANGE UP (ref 40–120)
ALT FLD-CCNC: 21 U/L — SIGNIFICANT CHANGE UP (ref 12–78)
ANION GAP SERPL CALC-SCNC: 5 MMOL/L — SIGNIFICANT CHANGE UP (ref 5–17)
AST SERPL-CCNC: 14 U/L — LOW (ref 15–37)
BASOPHILS # BLD AUTO: 0.03 K/UL — SIGNIFICANT CHANGE UP (ref 0–0.2)
BASOPHILS NFR BLD AUTO: 0.4 % — SIGNIFICANT CHANGE UP (ref 0–2)
BILIRUB SERPL-MCNC: 0.4 MG/DL — SIGNIFICANT CHANGE UP (ref 0.2–1.2)
BUN SERPL-MCNC: 20 MG/DL — SIGNIFICANT CHANGE UP (ref 7–23)
CALCIUM SERPL-MCNC: 9.1 MG/DL — SIGNIFICANT CHANGE UP (ref 8.5–10.1)
CHLORIDE SERPL-SCNC: 109 MMOL/L — HIGH (ref 96–108)
CO2 SERPL-SCNC: 25 MMOL/L — SIGNIFICANT CHANGE UP (ref 22–31)
CREAT SERPL-MCNC: 0.94 MG/DL — SIGNIFICANT CHANGE UP (ref 0.5–1.3)
EGFR: 71 ML/MIN/1.73M2 — SIGNIFICANT CHANGE UP
EOSINOPHIL # BLD AUTO: 0.32 K/UL — SIGNIFICANT CHANGE UP (ref 0–0.5)
EOSINOPHIL NFR BLD AUTO: 4.2 % — SIGNIFICANT CHANGE UP (ref 0–6)
GLUCOSE SERPL-MCNC: 108 MG/DL — HIGH (ref 70–99)
HCT VFR BLD CALC: 37.4 % — SIGNIFICANT CHANGE UP (ref 34.5–45)
HGB BLD-MCNC: 12.2 G/DL — SIGNIFICANT CHANGE UP (ref 11.5–15.5)
IMM GRANULOCYTES NFR BLD AUTO: 0.3 % — SIGNIFICANT CHANGE UP (ref 0–0.9)
LYMPHOCYTES # BLD AUTO: 3.33 K/UL — HIGH (ref 1–3.3)
LYMPHOCYTES # BLD AUTO: 44 % — SIGNIFICANT CHANGE UP (ref 13–44)
MAGNESIUM SERPL-MCNC: 2.1 MG/DL — SIGNIFICANT CHANGE UP (ref 1.6–2.6)
MCHC RBC-ENTMCNC: 27.4 PG — SIGNIFICANT CHANGE UP (ref 27–34)
MCHC RBC-ENTMCNC: 32.6 GM/DL — SIGNIFICANT CHANGE UP (ref 32–36)
MCV RBC AUTO: 84 FL — SIGNIFICANT CHANGE UP (ref 80–100)
MONOCYTES # BLD AUTO: 0.5 K/UL — SIGNIFICANT CHANGE UP (ref 0–0.9)
MONOCYTES NFR BLD AUTO: 6.6 % — SIGNIFICANT CHANGE UP (ref 2–14)
NEUTROPHILS # BLD AUTO: 3.36 K/UL — SIGNIFICANT CHANGE UP (ref 1.8–7.4)
NEUTROPHILS NFR BLD AUTO: 44.5 % — SIGNIFICANT CHANGE UP (ref 43–77)
PHOSPHATE SERPL-MCNC: 3 MG/DL — SIGNIFICANT CHANGE UP (ref 2.5–4.5)
PLATELET # BLD AUTO: 362 K/UL — SIGNIFICANT CHANGE UP (ref 150–400)
POTASSIUM SERPL-MCNC: 4.2 MMOL/L — SIGNIFICANT CHANGE UP (ref 3.5–5.3)
POTASSIUM SERPL-SCNC: 4.2 MMOL/L — SIGNIFICANT CHANGE UP (ref 3.5–5.3)
PROT SERPL-MCNC: 7.8 GM/DL — SIGNIFICANT CHANGE UP (ref 6–8.3)
RBC # BLD: 4.45 M/UL — SIGNIFICANT CHANGE UP (ref 3.8–5.2)
RBC # FLD: 14 % — SIGNIFICANT CHANGE UP (ref 10.3–14.5)
SODIUM SERPL-SCNC: 139 MMOL/L — SIGNIFICANT CHANGE UP (ref 135–145)
WBC # BLD: 7.56 K/UL — SIGNIFICANT CHANGE UP (ref 3.8–10.5)
WBC # FLD AUTO: 7.56 K/UL — SIGNIFICANT CHANGE UP (ref 3.8–10.5)

## 2023-09-11 PROCEDURE — 99239 HOSP IP/OBS DSCHRG MGMT >30: CPT

## 2023-09-11 PROCEDURE — 93306 TTE W/DOPPLER COMPLETE: CPT | Mod: 26

## 2023-09-11 RX ORDER — ASPIRIN/CALCIUM CARB/MAGNESIUM 324 MG
1 TABLET ORAL
Qty: 30 | Refills: 1
Start: 2023-09-11 | End: 2023-11-09

## 2023-09-11 RX ORDER — AMLODIPINE BESYLATE 2.5 MG/1
1 TABLET ORAL
Qty: 30 | Refills: 1
Start: 2023-09-11 | End: 2023-11-09

## 2023-09-11 RX ORDER — ATORVASTATIN CALCIUM 80 MG/1
1 TABLET, FILM COATED ORAL
Qty: 30 | Refills: 1
Start: 2023-09-11 | End: 2023-11-09

## 2023-09-11 RX ORDER — AMLODIPINE BESYLATE 2.5 MG/1
1 TABLET ORAL
Refills: 0 | DISCHARGE

## 2023-09-11 RX ORDER — CLOPIDOGREL BISULFATE 75 MG/1
1 TABLET, FILM COATED ORAL
Qty: 20 | Refills: 0
Start: 2023-09-11 | End: 2023-09-30

## 2023-09-11 RX ADMIN — Medication 650 MILLIGRAM(S): at 10:10

## 2023-09-11 RX ADMIN — AMLODIPINE BESYLATE 5 MILLIGRAM(S): 2.5 TABLET ORAL at 09:41

## 2023-09-11 RX ADMIN — Medication 81 MILLIGRAM(S): at 09:41

## 2023-09-11 RX ADMIN — CLOPIDOGREL BISULFATE 75 MILLIGRAM(S): 75 TABLET, FILM COATED ORAL at 09:41

## 2023-09-11 RX ADMIN — Medication 650 MILLIGRAM(S): at 09:41

## 2023-09-11 RX ADMIN — HEPARIN SODIUM 5000 UNIT(S): 5000 INJECTION INTRAVENOUS; SUBCUTANEOUS at 09:40

## 2023-09-11 NOTE — OCCUPATIONAL THERAPY INITIAL EVALUATION ADULT - PERTINENT HX OF CURRENT PROBLEM, REHAB EVAL
Per EMR, patient is a 56 y/o female with PMHx of hypertension (was on meds - now not because she ran out of it) admitted in the hospital 9/9 with cc of (R) sided weakness. According to the patient, she went to bed - approximately 10 PM on 9/8/23. On 9/9/23 she woke up sometime between 5:30-6:30 AM with right sided flaccid paralysis. Motor weakness resolved . ED workup with high BP / CT normal /  High BPs. MRI head: Acute left basal ganglia/corona radiata infarction.

## 2023-09-11 NOTE — DISCHARGE NOTE PROVIDER - NSDCFUADDAPPT_GEN_ALL_CORE_FT
Follow-up appointment with Neurology (Dr. Smith) on Wednesday, October 25, 2023 at 2:00pm    Follow up with the [facility] to obtain a new primary care provider/doctor  [phone]  [address] [place] [address], [facility] [address] , [place] [address]

## 2023-09-11 NOTE — DISCHARGE NOTE PROVIDER - HOSPITAL COURSE
Physical Exam:  Vital Signs Last 24 Hrs  T(C): 36.3 (11 Sep 2023 09:03), Max: 37 (10 Sep 2023 16:28)  T(F): 97.4 (11 Sep 2023 09:03), Max: 98.6 (10 Sep 2023 16:28)  HR: 80 (11 Sep 2023 09:03) (72 - 85)  BP: 118/85 (11 Sep 2023 09:03) (110/72 - 124/58)  RR: 17 (11 Sep 2023 09:03) (17 - 18)  SpO2: 98% (11 Sep 2023 09:03) (94% - 98%)    Parameters below as of 11 Sep 2023 09:03  Patient On (Oxygen Delivery Method): room air    Constitutional: NAD, awake and alert  HEENT: PERRLA, EOMI, MMM  Respiratory: Breath sounds are clear bilaterally, No wheezing, rales or rhonchi  Cardiovascular: S1 and S2, RRR, no murmurs, gallops or rubs  Gastrointestinal: +BS, soft, non-tender, non-distended, no CVA tenderness  Extremities: No peripheral edema, +DP pulses b/l  Neurological: A&O x 3, no focal deficits  Musculoskeletal: 5/5 strength LUE/LLE, 4.5/5 strength RUE/RLE   Skin: Normal, skin warm and dry    Assessment/Plan:  58 y/o/f with PMHx of hypertension (was on meds -  now not because she ran out of it) admitted in the hospital 9/9 with cc of (R) sided weakness. According to the patient, she went to bed - approximately 10 PM on 9/8/23. On 9/9/23 she woke up sometime between 5:30-6:30 AM with right sided flaccid paralysis. Motor weakness has since resolved . ED workup with high BP / CT normal. Admitted for:    1. Acute left basal ganglia/corona radiata infarction:  -NIHSS in ED 5, out of window for TPA. Symptoms now nearly resolved   -CTA H/N without LVO or high grade stenosis   -MRI head with acute L basal ganglia, corona radiata infarct   - - continue statin  -suspected to be related to uncontrolled HTN per neurology.   -continue asa/plavix x21 days, then continue ASA monotherapy  -TTE prelim with EF 55-60%, mild-mod MR, overall unremarkable   -s/p tele monitoring, no acute events or afib noted   -PT eval appreciated, not a candidate for acute rehab     2. HTN:  -continue norvasc      Dispo: discharge to home in stable condition    Final diagnosis, treatment plan, and follow-up recommendations were discussed and explained to the patient. The patient was given an opportunity to ask questions concerning the diagnosis and treatment plan. The patient acknowledged understanding of the diagnosis, treatment, and follow-up recommendations. The patient was advised to seek urgent care upon discharge if worsening symptoms develop prior to scheduled follow-up. Time spent on discharge included time with the patient, and also coordinating discharge care as outlined below.    Total time spent: 50 min

## 2023-09-11 NOTE — OCCUPATIONAL THERAPY INITIAL EVALUATION ADULT - VISUAL ASSESSMENT: TRACKING
smooth pursuits intact, pt with difficulty following commands for other visual assessments likely due to language barrier. line bisection test- inconsistent vs pt not understanding instruction, recommend following up with neuro ophthalmologist to further assess, pt reports no diplopia or blurry vision.

## 2023-09-11 NOTE — DISCHARGE NOTE NURSING/CASE MANAGEMENT/SOCIAL WORK - NSDCPEFALRISK_GEN_ALL_CORE
For information on Fall & Injury Prevention, visit: [email or web address] OR  [email or web address] OR  https://www.cdc.gov/steadi/patient.html

## 2023-09-11 NOTE — DISCHARGE NOTE PROVIDER - NSDCFUSCHEDAPPT_GEN_ALL_CORE_FT
Nathaly Smith  [facility] Physician [facility]  NEUROLOGY [facility]  Scheduled Appointment: 10/25/2023

## 2023-09-11 NOTE — DISCHARGE NOTE PROVIDER - PROVIDER TOKENS
PROVIDER:[TOKEN:[[id number]],SCHEDULEDAPPT:[10/25/2023],SCHEDULEDAPPTTIME:[02:00 PM],ESTABLISHEDPATIENT:[T]]

## 2023-09-11 NOTE — DISCHARGE NOTE PROVIDER - ATTENDING DISCHARGE PHYSICAL EXAMINATION:
Vital Signs Last 24 Hrs  T(C): 36.3 (11 Sep 2023 09:03), Max: 37 (10 Sep 2023 16:28)  T(F): 97.4 (11 Sep 2023 09:03), Max: 98.6 (10 Sep 2023 16:28)  HR: 80 (11 Sep 2023 09:03) (72 - 85)  BP: 118/85 (11 Sep 2023 09:03) (110/72 - 124/58)  RR: 17 (11 Sep 2023 09:03) (17 - 18)  SpO2: 98% (11 Sep 2023 09:03) (94% - 98%)    Parameters below as of 11 Sep 2023 09:03  Patient On (Oxygen Delivery Method): room air    Constitutional: NAD, awake and alert  HEENT: PERRL, EOMI, MMM  Respiratory: Breath sounds are clear bilaterally; normal respiratory effort  Cardiovascular: S1 and S2, RRR,   Gastrointestinal: +BS, soft, non-tender, non-distended,  Extremities: No peripheral edema, +DP pulses b/l  Neurological: A&O x 3, no focal deficits  Musculoskeletal: 5/5 strength LUE/LLE, 4/5 strength RUE/RLE   Clinically stable and functional. Discharge home in stable condition and close outpatient follow up.

## 2023-09-11 NOTE — DISCHARGE NOTE PROVIDER - NSDCHHASSISTILLNESS_GEN_ALL_CORE
acute stroke
Angel Aguirre)  Obstetrics and Gynecology  [address], [address]  [place] [address]  Phone: [phone]  Fax: [phone]  Follow Up Time:

## 2023-09-11 NOTE — OCCUPATIONAL THERAPY INITIAL EVALUATION ADULT - ADDITIONAL COMMENTS
Pt reports she resides alone in a  with no [place] if using alternate entrance and no steps once inside. Pt has a walk in shower stall, standard toilet. Pt reports being (I) with ADL/IADL tasks, walked without AD, (-) - reports her brother takes her to the food store or she takes a cab. RHD

## 2023-09-11 NOTE — DISCHARGE NOTE PROVIDER - NSDCMRMEDTOKEN_GEN_ALL_CORE_FT
amLODIPine 5 mg oral tablet: 1 tab(s) orally once a day  aspirin 81 mg oral tablet, chewable: 1 tab(s) orally once a day  atorvastatin 80 mg oral tablet: 1 tab(s) orally once a day (at bedtime)  clopidogrel 75 mg oral tablet: 1 tab(s) orally once a day

## 2023-09-11 NOTE — OCCUPATIONAL THERAPY INITIAL EVALUATION ADULT - ADL RETRAINING, OT EVAL
Pt will perform LE dressing and footwear management with MI in 1-2 tx sessions. Pt will perform toileting with MI in 1-2 tx sessions.

## 2023-09-11 NOTE — OCCUPATIONAL THERAPY INITIAL EVALUATION ADULT - COGNITIVE, VISUAL PERCEPTUAL, OT EVAL
Patient will improve memory and recall 3/3 items for medication management in 1 week. Pt will perform line bisection test with 100% accuracy in 1-2 tx sessions to enhance safety when engaging in morning ADL routine.

## 2023-09-11 NOTE — DISCHARGE NOTE NURSING/CASE MANAGEMENT/SOCIAL WORK - PATIENT PORTAL LINK FT
You can access the FollowMyHealth Patient Portal offered by [facility] by registering at the following website: [email or web address]. By joining [facility]’s FollowMyHealth portal, you will also be able to view your health information using other applications (apps) compatible with our system.

## 2023-09-11 NOTE — OCCUPATIONAL THERAPY INITIAL EVALUATION ADULT - MD ORDER
"OT Evaluate and Treat"- MD orders received. Chart reviewed, contents noted, conferred with RN "OT Evaluate and Treat"- MD orders received. Chart reviewed, contents noted, conferred with YEN Rios, pt is [place] Creole speaking,  used t/o (Isabel, [id number]).

## 2023-09-11 NOTE — OCCUPATIONAL THERAPY INITIAL EVALUATION ADULT - PRECAUTIONS/LIMITATIONS, REHAB EVAL
diet: regular, elevate HOB 30 degrees, supervise meals, 2L SpO2 <92%/aspiration precautions/cardiac precautions/fall precautions/seizure precautions

## 2023-09-11 NOTE — DISCHARGE NOTE PROVIDER - CARE PROVIDER_API CALL
Nathaly Smith  Neurology  [address] [place] [address], [place] [address]  Phone: [phone]  Fax: [phone]  Established Patient  Scheduled Appointment: 10/25/2023 02:00 PM

## 2023-09-11 NOTE — DISCHARGE NOTE PROVIDER - NSDCCPCAREPLAN_GEN_ALL_CORE_FT
PRINCIPAL DISCHARGE DIAGNOSIS  Diagnosis: Acute cerebrovascular accident (CVA)  Assessment and Plan of Treatment: PEDRO PABLO YON KÒSÒK YE? Yon konjesyon serebral (CVA) rive lè sikilasyon bell nan sèvo entèwonp sa ki lakòz mank oksijèn.  BAGAY ANH FÈ: (1) Mark kondisyon sante w tankou dyabèt oswa tansyon wo (2) Siveye tansyon w (3) Evite pwodwi nikotin – fimen ka lakòz domaj nan veso sangen w yo epi ogmante risk anh yon konjesyon serebral (4) Kenbe yon sante. pwa (5) Rete aktif ak fè egzèsis (6) Limite oswa evite konsomasyon alkòl – alkòl ka ogmante tansyon ou (7) Manje manje ki an sante anh kè tankou fwi, legim, ak grenn antye.  SIVEYE SIY AK SENTÒM SA YO: (1) Pèdi balans oswa konfizyon (2) Doub vizyon oswa pèt vizyon (3) Doulè nan pwatrin oswa souf kout (4) Pwoblèm anh cassidy. Si w gen nenpòt nan bagay sa yo, fè konnen founisè swen prensipal ou a, oswa retounen nan Depatman Ijans si w lencho w malad anpil.  sentòm, gen anpil chans akòz tansyon wo  Kontinye Aspirin 81mg yon fwa pa [place]  Kontinye Plavix 75mg yon fwa pa [place] x 21 [place] total apresa SISPANN (Dènye dòz 1ye oktòb)  Kontinye Atorvastatin 80mg yon fwa pa [place] lè yo dòmi  Swiv ak newològ Dr Smith ak founisè swen prensipal ou anh plis jesyon avanse        SECONDARY DISCHARGE DIAGNOSES  Diagnosis: Hypertensive disease  Assessment and Plan of Treatment: Continue Amlodipine 5mg once a day   Kontinye Amlodipine 5 mg yon fwa pa [place]    Diagnosis: Acute CVA (cerebrovascular accident)  Assessment and Plan of Treatment: WHAT IS A STROKE? A stroke (CVA) occurs when blood flow to brain is interrupted causing lack of oxygen.  THINGS TO DO: (1) Manage your health conditions like diabetes or hypertension (2) Monitor your blood pressure (3) Avoid nicotine products – smoking can cause damage to your blood vessels and increase your risk for a stroke (4) Maintain a healthy weight (5) Stay active with exercise (6) Limit or avoid alcohol intake – alcohol can raise your blood pressure (7) Eat heart healthy foods like fruits, vegetables, and whole grains  MONITOR THESE SIGNS AND SYMPTOMS: (1) Loss of balance or confusion (2) Double vision or vision loss (3) Chest pain or shortness of breath (4) Trouble swallowing. If you experience any of these, DO alert your primary care provider, or return to the Emergency Department if you feel very sick.   MRI scan of brain showed an acute left sided stroke as cause of your symptoms, likely due to high blood pressure   Continue Aspirin 81mg once a day  Continue Plavix 75mg once a day x21 days total then STOP (Last dose on October 1st)  Continue Atorvastatin 80mg once a day at bedtime   Follow up with Neurologist Dr. Smith and your primary care provider for further management moving forward

## 2023-09-11 NOTE — OCCUPATIONAL THERAPY INITIAL EVALUATION ADULT - NSOTDISCHREC_GEN_A_CORE
home with constant supervision 2* impaired cognition for higher level ADL and IADL tasks home with daily supervision 2* impaired cognition for higher level ADL and IADL tasks

## 2023-09-15 PROBLEM — Z00.00 ENCOUNTER FOR PREVENTIVE HEALTH EXAMINATION: Status: ACTIVE | Noted: 2023-09-15

## 2023-09-18 DIAGNOSIS — I10 ESSENTIAL (PRIMARY) HYPERTENSION: ICD-10-CM

## 2023-09-18 DIAGNOSIS — I16.1 HYPERTENSIVE EMERGENCY: ICD-10-CM

## 2023-09-18 DIAGNOSIS — G81.93 HEMIPLEGIA, UNSPECIFIED AFFECTING RIGHT NONDOMINANT SIDE: ICD-10-CM

## 2023-09-18 DIAGNOSIS — R29.705 NIHSS SCORE 5: ICD-10-CM

## 2023-09-18 DIAGNOSIS — I63.231 CEREBRAL INFARCTION DUE TO UNSPECIFIED OCCLUSION OR STENOSIS OF RIGHT CAROTID ARTERIES: ICD-10-CM

## 2023-10-25 ENCOUNTER — APPOINTMENT (OUTPATIENT)
Dept: NEUROLOGY | Facility: CLINIC | Age: 58
End: 2023-10-25
Payer: MEDICAID

## 2023-10-25 VITALS
HEIGHT: 66 IN | HEART RATE: 96 BPM | DIASTOLIC BLOOD PRESSURE: 87 MMHG | SYSTOLIC BLOOD PRESSURE: 139 MMHG | TEMPERATURE: 97.8 F | WEIGHT: 173 LBS | BODY MASS INDEX: 27.8 KG/M2

## 2023-10-25 DIAGNOSIS — R06.83 SNORING: ICD-10-CM

## 2023-10-25 DIAGNOSIS — I63.9 CEREBRAL INFARCTION, UNSPECIFIED: ICD-10-CM

## 2023-10-25 PROBLEM — I10 ESSENTIAL (PRIMARY) HYPERTENSION: Chronic | Status: ACTIVE | Noted: 2023-09-09

## 2023-10-25 PROCEDURE — 99214 OFFICE O/P EST MOD 30 MIN: CPT

## 2023-10-31 ENCOUNTER — APPOINTMENT (OUTPATIENT)
Dept: NEUROLOGY | Facility: CLINIC | Age: 58
End: 2023-10-31
Payer: MEDICAID

## 2023-10-31 PROCEDURE — 95806 SLEEP STUDY UNATT&RESP EFFT: CPT

## 2023-12-15 ENCOUNTER — NON-APPOINTMENT (OUTPATIENT)
Age: 58
End: 2023-12-15

## 2024-01-16 ENCOUNTER — RX RENEWAL (OUTPATIENT)
Age: 59
End: 2024-01-16

## 2024-01-16 RX ORDER — AMLODIPINE BESYLATE 5 MG/1
5 TABLET ORAL
Qty: 30 | Refills: 0 | Status: ACTIVE | COMMUNITY
Start: 2023-10-25 | End: 1900-01-01

## 2024-02-13 ENCOUNTER — RX RENEWAL (OUTPATIENT)
Age: 59
End: 2024-02-13

## 2024-02-13 RX ORDER — ATORVASTATIN CALCIUM 80 MG/1
80 TABLET, FILM COATED ORAL
Qty: 30 | Refills: 0 | Status: ACTIVE | COMMUNITY
Start: 2023-10-25 | End: 1900-01-01

## 2024-08-09 ENCOUNTER — APPOINTMENT (OUTPATIENT)
Dept: NEUROLOGY | Facility: CLINIC | Age: 59
End: 2024-08-09

## 2024-09-25 ENCOUNTER — APPOINTMENT (OUTPATIENT)
Dept: NEUROLOGY | Facility: CLINIC | Age: 59
End: 2024-09-25